# Patient Record
Sex: MALE | Race: WHITE | NOT HISPANIC OR LATINO | Employment: FULL TIME | URBAN - METROPOLITAN AREA
[De-identification: names, ages, dates, MRNs, and addresses within clinical notes are randomized per-mention and may not be internally consistent; named-entity substitution may affect disease eponyms.]

---

## 2019-10-18 ENCOUNTER — HOSPITAL ENCOUNTER (EMERGENCY)
Facility: HOSPITAL | Age: 19
Discharge: HOME | End: 2019-10-19
Attending: EMERGENCY MEDICINE | Admitting: EMERGENCY MEDICINE

## 2019-10-18 ENCOUNTER — APPOINTMENT (OUTPATIENT)
Dept: CT IMAGING | Facility: HOSPITAL | Age: 19
End: 2019-10-18

## 2019-10-18 DIAGNOSIS — G43.109 OCULAR MIGRAINE: Primary | ICD-10-CM

## 2019-10-18 DIAGNOSIS — G43.009 MIGRAINE WITHOUT AURA AND WITHOUT STATUS MIGRAINOSUS, NOT INTRACTABLE: ICD-10-CM

## 2019-10-18 DIAGNOSIS — R03.0 ELEVATED BLOOD PRESSURE READING: ICD-10-CM

## 2019-10-18 PROCEDURE — 96375 TX/PRO/DX INJ NEW DRUG ADDON: CPT

## 2019-10-18 PROCEDURE — 99283 EMERGENCY DEPT VISIT LOW MDM: CPT

## 2019-10-18 PROCEDURE — 25010000002 KETOROLAC TROMETHAMINE PER 15 MG: Performed by: EMERGENCY MEDICINE

## 2019-10-18 PROCEDURE — 25010000002 DIPHENHYDRAMINE PER 50 MG: Performed by: EMERGENCY MEDICINE

## 2019-10-18 PROCEDURE — 96374 THER/PROPH/DIAG INJ IV PUSH: CPT

## 2019-10-18 PROCEDURE — 25010000002 METOCLOPRAMIDE PER 10 MG: Performed by: EMERGENCY MEDICINE

## 2019-10-18 PROCEDURE — 70450 CT HEAD/BRAIN W/O DYE: CPT

## 2019-10-18 RX ORDER — METOCLOPRAMIDE HYDROCHLORIDE 5 MG/ML
10 INJECTION INTRAMUSCULAR; INTRAVENOUS ONCE
Status: COMPLETED | OUTPATIENT
Start: 2019-10-18 | End: 2019-10-18

## 2019-10-18 RX ORDER — SODIUM CHLORIDE 0.9 % (FLUSH) 0.9 %
10 SYRINGE (ML) INJECTION AS NEEDED
Status: DISCONTINUED | OUTPATIENT
Start: 2019-10-18 | End: 2019-10-19 | Stop reason: HOSPADM

## 2019-10-18 RX ORDER — DIPHENHYDRAMINE HYDROCHLORIDE 50 MG/ML
25 INJECTION INTRAMUSCULAR; INTRAVENOUS ONCE
Status: COMPLETED | OUTPATIENT
Start: 2019-10-18 | End: 2019-10-18

## 2019-10-18 RX ORDER — KETOROLAC TROMETHAMINE 15 MG/ML
10 INJECTION, SOLUTION INTRAMUSCULAR; INTRAVENOUS ONCE
Status: COMPLETED | OUTPATIENT
Start: 2019-10-18 | End: 2019-10-18

## 2019-10-18 RX ADMIN — METOCLOPRAMIDE 10 MG: 5 INJECTION, SOLUTION INTRAMUSCULAR; INTRAVENOUS at 22:54

## 2019-10-18 RX ADMIN — DIPHENHYDRAMINE HYDROCHLORIDE 25 MG: 50 INJECTION INTRAMUSCULAR; INTRAVENOUS at 22:55

## 2019-10-18 RX ADMIN — SODIUM CHLORIDE, POTASSIUM CHLORIDE, SODIUM LACTATE AND CALCIUM CHLORIDE 1000 ML: 600; 310; 30; 20 INJECTION, SOLUTION INTRAVENOUS at 22:55

## 2019-10-18 RX ADMIN — KETOROLAC TROMETHAMINE 10 MG: 15 INJECTION, SOLUTION INTRAMUSCULAR; INTRAVENOUS at 22:55

## 2019-10-19 VITALS
RESPIRATION RATE: 16 BRPM | WEIGHT: 175 LBS | HEIGHT: 70 IN | OXYGEN SATURATION: 98 % | DIASTOLIC BLOOD PRESSURE: 72 MMHG | TEMPERATURE: 98.5 F | SYSTOLIC BLOOD PRESSURE: 138 MMHG | HEART RATE: 72 BPM | BODY MASS INDEX: 25.05 KG/M2

## 2019-10-19 NOTE — ED PROVIDER NOTES
Messi Jason is a 19 y.o male who presents to the ED with complaints of loss of vision. Patient reports approximately 30 minutes before arrival to the ED, he began seeing floaters followed by peripheral vision loss in his left eye. His vision loss resolved upon arrival and was accompanied by a headache. No eye discharge, tinnitus, neck pain, or syncope. No fever or chills. No nausea, vomiting, or diarrhea. No recent head injuries. He is a non-smoker. He denies drug and alcohol abuse. There are no other acute symptoms at this time.        History provided by:  Patient  Eye Problem   Location:  Left eye  Associated symptoms: decreased vision and headaches    Associated symptoms: no discharge, no nausea and no vomiting    Headaches:     Severity:  Moderate    Onset quality:  Sudden    Duration:  2 hours    Timing:  Constant    Progression:  Unchanged    Chronicity:  New      Review of Systems   Constitutional: Negative for chills and fever.   HENT: Negative for tinnitus.    Eyes: Positive for visual disturbance. Negative for discharge.   Gastrointestinal: Negative for diarrhea, nausea and vomiting.   Musculoskeletal: Negative for neck pain.   Neurological: Positive for headaches. Negative for syncope.   All other systems reviewed and are negative.      History reviewed. No pertinent past medical history.    Allergies   Allergen Reactions   • Keflex [Cephalexin] Rash       History reviewed. No pertinent surgical history.    History reviewed. No pertinent family history.    Social History     Socioeconomic History   • Marital status: Single     Spouse name: Not on file   • Number of children: Not on file   • Years of education: Not on file   • Highest education level: Not on file   Tobacco Use   • Smoking status: Never Smoker   • Smokeless tobacco: Never Used   Substance and Sexual Activity   • Alcohol use: No     Frequency: Never   • Drug use: No         Objective   Physical Exam   Constitutional: He is  oriented to person, place, and time. He appears well-developed and well-nourished. No distress.   HENT:   Head: Normocephalic and atraumatic.   Nose: Nose normal.   Eyes: Conjunctivae and EOM are normal. Pupils are equal, round, and reactive to light. Right conjunctiva has no hemorrhage. Left conjunctiva has no hemorrhage. No scleral icterus.   Fundoscopic exam:       The left eye shows no AV nicking, no exudate and no hemorrhage.   Slit lamp exam:       The left eye shows no corneal flare, no hyphema and no hypopyon.   Vision test unremarkable. No retinal detachment. Anterior chamber clear.   Neck: Normal range of motion. Neck supple. No neck rigidity.   Cardiovascular: Normal rate, regular rhythm and normal heart sounds.   No murmur heard.  Pulmonary/Chest: Effort normal and breath sounds normal. No respiratory distress.   Abdominal: Soft. There is no tenderness.   Musculoskeletal: Normal range of motion. He exhibits no edema.   Lymphadenopathy:     He has no cervical adenopathy.   Neurological: He is alert and oriented to person, place, and time.   Skin: Skin is warm and dry.   Psychiatric: He has a normal mood and affect. His behavior is normal.   Nursing note and vitals reviewed.      Procedures         ED Course  ED Course as of Oct 19 0122   Sat Oct 19, 2019   0001 Patient symptoms have completely resolved and he is resting comfortably without complaints.  Findings most consistent with migraine headache with ocular involvement.  We will plan to discharge the patient home to follow-up with primary care with return to the ER for any concerns. I had a discussion with the patient/family regarding diagnosis, diagnostic results, treatment plan, and medications.  The patient/family indicated understanding of these instructions.  I spent adequate time at the bedside proceeding discharge necessary to personally discuss the aftercare instructions, giving patient education, providing explanations of the results of our  "evaluations/findings, and my decision making to assure that the patient/family understand the plan of care.  Time was allotted to answer questions at that time and throughout the ED course.  Emphasis was placed on timely follow-up after discharge.  I also discussed the potential for the development of an acute emergent condition requiring further evaluation, admission, or even surgical intervention. I discussed that we found nothing during the visit today indicating the need for further workup, admission, or the presence of an unstable medical condition.  I encouraged the patient to return to the emergency department immediately for ANY concerns, worsening, new complaints, or if symptoms persist and unable to seek follow-up in a timely fashion.  The patient/family expressed understanding and agreement with this plan.   [RS]      ED Course User Index  [RS] Dong Worthington MD     No results found for this or any previous visit (from the past 24 hour(s)).  Note: In addition to lab results from this visit, the labs listed above may include labs taken at another facility or during a different encounter within the last 24 hours. Please correlate lab times with ED admission and discharge times for further clarification of the services performed during this visit.    CT Head Without Contrast   Final Result   Normal, negative unenhanced head CT.               Signer Name: Dave Hunter MD    Signed: 10/18/2019 9:37 PM    Workstation Name: [id number]     [facility]        Vitals:    10/18/19 2038 10/18/19 2230 10/19/19 0030   BP: 139/86 144/77 138/72   BP Location: Left arm  Left arm   Patient Position: Sitting  Sitting   Pulse: 68  72   Resp: 16  16   Temp: 98.7 °F (37.1 °C)  98.5 °F (36.9 °C)   TempSrc: Oral  Oral   SpO2: 100% 97% 98%   Weight: 79.4 kg (175 lb)     Height: 177.8 cm (70\")       Medications   sodium chloride 0.9 % flush 10 mL (not administered)   lactated ringers bolus 1,000 mL " (0 mL Intravenous Stopped 10/18/19 5540)   ketorolac (TORADOL) injection 10 mg (10 mg Intravenous Given 10/18/19 2255)   metoclopramide (REGLAN) injection 10 mg (10 mg Intravenous Given 10/18/19 2254)   diphenhydrAMINE (BENADRYL) injection 25 mg (25 mg Intravenous Given 10/18/19 2255)     ECG/EMG Results (last 24 hours)     ** No results found for the last 24 hours. **        No orders to display                     MDM  Number of Diagnoses or Management Options  Elevated blood pressure reading:   Migraine without aura and without status migrainosus, not intractable:   Ocular migraine:      Amount and/or Complexity of Data Reviewed  Tests in the radiology section of CPT®: reviewed  Independent visualization of images, tracings, or specimens: yes        Final diagnoses:   Ocular migraine   Migraine without aura and without status migrainosus, not intractable   Elevated blood pressure reading       Documentation assistance provided by chano Orellana.  Information recorded by the scribe was done at my direction and has been verified and validated by me.     Mp Orellana  10/18/19 3122       Dong Worthington MD  10/19/19 8855